# Patient Record
Sex: FEMALE | Race: WHITE | NOT HISPANIC OR LATINO | Employment: UNEMPLOYED | URBAN - METROPOLITAN AREA
[De-identification: names, ages, dates, MRNs, and addresses within clinical notes are randomized per-mention and may not be internally consistent; named-entity substitution may affect disease eponyms.]

---

## 2021-02-10 ENCOUNTER — IMMUNIZATION (OUTPATIENT)
Dept: VACCINE CLINIC | Facility: HOSPITAL | Age: 41
End: 2021-02-10

## 2021-02-10 PROCEDURE — 91300 HC SARSCOV02 VAC 30MCG/0.3ML IM: CPT | Performed by: INTERNAL MEDICINE

## 2021-02-10 PROCEDURE — 0001A: CPT | Performed by: INTERNAL MEDICINE

## 2021-03-03 ENCOUNTER — IMMUNIZATION (OUTPATIENT)
Dept: VACCINE CLINIC | Facility: HOSPITAL | Age: 41
End: 2021-03-03

## 2021-03-03 PROCEDURE — 0002A: CPT | Performed by: INTERNAL MEDICINE

## 2021-03-03 PROCEDURE — 91300 HC SARSCOV02 VAC 30MCG/0.3ML IM: CPT | Performed by: INTERNAL MEDICINE

## 2021-11-16 ENCOUNTER — IMMUNIZATION (OUTPATIENT)
Dept: VACCINE CLINIC | Facility: HOSPITAL | Age: 41
End: 2021-11-16

## 2021-11-16 PROCEDURE — 0004A ADM SARSCOV2 30MCG/0.3ML BOOSTER: CPT | Performed by: INTERNAL MEDICINE

## 2021-11-16 PROCEDURE — 91300 HC SARSCOV02 VAC 30MCG/0.3ML IM: CPT | Performed by: INTERNAL MEDICINE

## 2023-05-13 ENCOUNTER — HOSPITAL ENCOUNTER (EMERGENCY)
Facility: HOSPITAL | Age: 43
Discharge: HOME | End: 2023-05-13
Attending: EMERGENCY MEDICINE
Payer: COMMERCIAL

## 2023-05-13 ENCOUNTER — APPOINTMENT (OUTPATIENT)
Dept: CT IMAGING | Facility: HOSPITAL | Age: 43
End: 2023-05-13
Payer: COMMERCIAL

## 2023-05-13 VITALS
BODY MASS INDEX: 24.25 KG/M2 | HEIGHT: 68 IN | SYSTOLIC BLOOD PRESSURE: 114 MMHG | DIASTOLIC BLOOD PRESSURE: 72 MMHG | WEIGHT: 160 LBS | OXYGEN SATURATION: 99 % | TEMPERATURE: 100.1 F | RESPIRATION RATE: 14 BRPM | HEART RATE: 99 BPM

## 2023-05-13 DIAGNOSIS — R11.2 NAUSEA AND VOMITING, UNSPECIFIED VOMITING TYPE: ICD-10-CM

## 2023-05-13 DIAGNOSIS — R51.9 ACUTE NONINTRACTABLE HEADACHE, UNSPECIFIED HEADACHE TYPE: Primary | ICD-10-CM

## 2023-05-13 LAB
ALBUMIN SERPL-MCNC: 4.4 G/DL (ref 3.5–5.2)
ALBUMIN/GLOB SERPL: 1.8 G/DL
ALP SERPL-CCNC: 82 U/L (ref 39–117)
ALT SERPL W P-5'-P-CCNC: 22 U/L (ref 1–33)
ANION GAP SERPL CALCULATED.3IONS-SCNC: 14.5 MMOL/L (ref 5–15)
AST SERPL-CCNC: 21 U/L (ref 1–32)
BASOPHILS # BLD AUTO: 0.01 10*3/MM3 (ref 0–0.2)
BASOPHILS NFR BLD AUTO: 0.2 % (ref 0–1.5)
BILIRUB SERPL-MCNC: 0.4 MG/DL (ref 0–1.2)
BUN SERPL-MCNC: 17 MG/DL (ref 6–20)
BUN/CREAT SERPL: 19.8 (ref 7–25)
CALCIUM SPEC-SCNC: 9.1 MG/DL (ref 8.6–10.5)
CHLORIDE SERPL-SCNC: 103 MMOL/L (ref 98–107)
CO2 SERPL-SCNC: 23.5 MMOL/L (ref 22–29)
CREAT SERPL-MCNC: 0.86 MG/DL (ref 0.57–1)
DEPRECATED RDW RBC AUTO: 44.4 FL (ref 37–54)
EGFRCR SERPLBLD CKD-EPI 2021: 87.2 ML/MIN/1.73
EOSINOPHIL # BLD AUTO: 0.01 10*3/MM3 (ref 0–0.4)
EOSINOPHIL NFR BLD AUTO: 0.2 % (ref 0.3–6.2)
ERYTHROCYTE [DISTWIDTH] IN BLOOD BY AUTOMATED COUNT: 13.6 % (ref 12.3–15.4)
GLOBULIN UR ELPH-MCNC: 2.5 GM/DL
GLUCOSE SERPL-MCNC: 115 MG/DL (ref 65–99)
HCT VFR BLD AUTO: 41.5 % (ref 34–46.6)
HGB BLD-MCNC: 13.6 G/DL (ref 12–15.9)
IMM GRANULOCYTES # BLD AUTO: 0.02 10*3/MM3 (ref 0–0.05)
IMM GRANULOCYTES NFR BLD AUTO: 0.3 % (ref 0–0.5)
LYMPHOCYTES # BLD AUTO: 0.66 10*3/MM3 (ref 0.7–3.1)
LYMPHOCYTES NFR BLD AUTO: 9.9 % (ref 19.6–45.3)
MCH RBC QN AUTO: 28.6 PG (ref 26.6–33)
MCHC RBC AUTO-ENTMCNC: 32.8 G/DL (ref 31.5–35.7)
MCV RBC AUTO: 87.4 FL (ref 79–97)
MONOCYTES # BLD AUTO: 0.45 10*3/MM3 (ref 0.1–0.9)
MONOCYTES NFR BLD AUTO: 6.8 % (ref 5–12)
NEUTROPHILS NFR BLD AUTO: 5.51 10*3/MM3 (ref 1.7–7)
NEUTROPHILS NFR BLD AUTO: 82.6 % (ref 42.7–76)
NRBC BLD AUTO-RTO: 0 /100 WBC (ref 0–0.2)
PLATELET # BLD AUTO: 256 10*3/MM3 (ref 140–450)
PMV BLD AUTO: 10.1 FL (ref 6–12)
POTASSIUM SERPL-SCNC: 4.1 MMOL/L (ref 3.5–5.2)
PROT SERPL-MCNC: 6.9 G/DL (ref 6–8.5)
RBC # BLD AUTO: 4.75 10*6/MM3 (ref 3.77–5.28)
SODIUM SERPL-SCNC: 141 MMOL/L (ref 136–145)
WBC NRBC COR # BLD: 6.66 10*3/MM3 (ref 3.4–10.8)

## 2023-05-13 PROCEDURE — 36415 COLL VENOUS BLD VENIPUNCTURE: CPT

## 2023-05-13 PROCEDURE — 99282 EMERGENCY DEPT VISIT SF MDM: CPT

## 2023-05-13 PROCEDURE — 70450 CT HEAD/BRAIN W/O DYE: CPT

## 2023-05-13 PROCEDURE — 85025 COMPLETE CBC W/AUTO DIFF WBC: CPT | Performed by: NURSE PRACTITIONER

## 2023-05-13 PROCEDURE — 80053 COMPREHEN METABOLIC PANEL: CPT | Performed by: NURSE PRACTITIONER

## 2023-05-13 RX ORDER — ONDANSETRON 4 MG/1
4 TABLET, ORALLY DISINTEGRATING ORAL EVERY 6 HOURS PRN
Qty: 12 TABLET | Refills: 0 | Status: SHIPPED | OUTPATIENT
Start: 2023-05-13

## 2023-05-13 RX ADMIN — SODIUM CHLORIDE, POTASSIUM CHLORIDE, SODIUM LACTATE AND CALCIUM CHLORIDE 1000 ML: 600; 310; 30; 20 INJECTION, SOLUTION INTRAVENOUS at 12:35

## 2023-05-13 NOTE — ED PROVIDER NOTES
EMERGENCY DEPARTMENT ENCOUNTER    Room Number:  04/04  Date of encounter:  5/13/2023  PCP: Lulu Stoner APRN  Patient Care Team:  Lulu Stoner APRN as PCP - General (Family Medicine)   Independent Historians: Patient          HPI:  Chief Complaint: Headache  A complete HPI/ROS/PMH/PSH/SH/FH are unobtainable due to: Nothing    Chronic or social conditions impacting patient care (social determinants of health): None    Context: Heather Schofield is a 43 y.o. female with a history of anxiety who arrives to the ED via private vehicle.  Patient presents with c/o mild, constant, dull diffuse headache since early this morning.  Patient states that around 1230 this morning she had an episode of violent vomiting, she had what she calls a headache that felt like her head was going to explode.  She states that she laid down on the bathroom floor for about 10 minutes and the headache was starting to improve.  She did attempt to go to a ER last night, however due to the long wait she went home.  She states she was able to go to bed and rest last night.  She states that as the day has progressed her headache has improved.  She did speak with her primary care and was recommended that she come to the ER.  She states that she has not vomited since around 2 AM this morning and has had a couple episodes of diarrhea.    Patient denies fever, chills, visual changes, neck pain, neck stiffness, dizziness or weakness.  Patient states that nothing makes the symptoms better and nothing worsens symptoms.  LMP-8 days ago.  Patient is not a smoker, no illicit drug use and occasional alcohol use.    Review of prior external notes (non-ED): Medical records reviewed in [facility], patient was seen at the [facility] 1/24/2023 for suspected exposure to COVID-19.    Review of prior external test results outside of this encounter: COVID-19 swab 1/24/2023 was negative    PAST MEDICAL HISTORY  Active Ambulatory Problems     Diagnosis Date Noted   • No  Active Ambulatory Problems     Resolved Ambulatory Problems     Diagnosis Date Noted   • No Resolved Ambulatory Problems     Past Medical History:   Diagnosis Date   • Anxiety        The patient has started, but not completed, their COVID-19 vaccination series.    PAST SURGICAL HISTORY  History reviewed. No pertinent surgical history.      FAMILY HISTORY  History reviewed. No pertinent family history.      SOCIAL HISTORY  Social History     Socioeconomic History   • Marital status:    Tobacco Use   • Smoking status: Never   Vaping Use   • Vaping Use: Never used   Substance and Sexual Activity   • Alcohol use: Yes     Comment: socially   • Drug use: Never   • Sexual activity: Defer         ALLERGIES  Cephalosporins and Penicillins        REVIEW OF SYSTEMS  Review of Systems     All systems reviewed and negative except for those discussed in HPI.       PHYSICAL EXAM    I have reviewed the triage vital signs and nursing notes.    ED Triage Vitals   Temp Heart Rate Resp BP SpO2   05/13/23 1209 05/13/23 1209 05/13/23 1209 05/13/23 1219 05/13/23 1209   100.1 °F (37.8 °C) (!) 140 20 124/83 97 %       Physical Exam  GENERAL: Well appearing, nontoxic appearing, not distressed  HENT: normocephalic, atraumatic  EYES: no scleral icterus, PERRL  CV: regular rhythm, regular rate, no murmur  RESPIRATORY: normal effort, CTAB  ABDOMEN: soft   MUSCULOSKELETAL: no deformity  No cervical, thoracic or lumbar vertebral tenderness to palpation  No step off or crepitus noted  NEURO: alert, moves all extremities, follows commands, mental status normal/baseline  Recent and remote memory functions are normal  Patient is attentive with normal concentration  Language is fluent  Speech is clear  Speech is nondysarthric  Symmetric smile with no facial droop  Eyes close shut strongly bilaterally  Symmetric eyebrow raise bilaterally  EOMI, PERRL  CN II-XII grossly normal otherwise  5/5 strength to bilateral upper and lower extremities  No  pronator drift  Intact FNF   SKIN: warm, dry, no rash   Psych: Appropriate mood and affect  Nursing notes and vital signs reviewed          LAB RESULTS  Recent Results (from the past 24 hour(s))   Comprehensive Metabolic Panel    Collection Time: 05/13/23  1:06 PM    Specimen: Blood   Result Value Ref Range    Glucose 115 (H) 65 - 99 mg/dL    BUN 17 6 - 20 mg/dL    Creatinine 0.86 0.57 - 1.00 mg/dL    Sodium 141 136 - 145 mmol/L    Potassium 4.1 3.5 - 5.2 mmol/L    Chloride 103 98 - 107 mmol/L    CO2 23.5 22.0 - 29.0 mmol/L    Calcium 9.1 8.6 - 10.5 mg/dL    Total Protein 6.9 6.0 - 8.5 g/dL    Albumin 4.4 3.5 - 5.2 g/dL    ALT (SGPT) 22 1 - 33 U/L    AST (SGOT) 21 1 - 32 U/L    Alkaline Phosphatase 82 39 - 117 U/L    Total Bilirubin 0.4 0.0 - 1.2 mg/dL    Globulin 2.5 gm/dL    A/G Ratio 1.8 g/dL    BUN/Creatinine Ratio 19.8 7.0 - 25.0    Anion Gap 14.5 5.0 - 15.0 mmol/L    eGFR 87.2 >60.0 mL/min/1.73   CBC Auto Differential    Collection Time: 05/13/23  1:06 PM    Specimen: Blood   Result Value Ref Range    WBC 6.66 3.40 - 10.80 10*3/mm3    RBC 4.75 3.77 - 5.28 10*6/mm3    Hemoglobin 13.6 12.0 - 15.9 g/dL    Hematocrit 41.5 34.0 - 46.6 %    MCV 87.4 79.0 - 97.0 fL    MCH 28.6 26.6 - 33.0 pg    MCHC 32.8 31.5 - 35.7 g/dL    RDW 13.6 12.3 - 15.4 %    RDW-SD 44.4 37.0 - 54.0 fl    MPV 10.1 6.0 - 12.0 fL    Platelets 256 140 - 450 10*3/mm3    Neutrophil % 82.6 (H) 42.7 - 76.0 %    Lymphocyte % 9.9 (L) 19.6 - 45.3 %    Monocyte % 6.8 5.0 - 12.0 %    Eosinophil % 0.2 (L) 0.3 - 6.2 %    Basophil % 0.2 0.0 - 1.5 %    Immature Grans % 0.3 0.0 - 0.5 %    Neutrophils, Absolute 5.51 1.70 - 7.00 10*3/mm3    Lymphocytes, Absolute 0.66 (L) 0.70 - 3.10 10*3/mm3    Monocytes, Absolute 0.45 0.10 - 0.90 10*3/mm3    Eosinophils, Absolute 0.01 0.00 - 0.40 10*3/mm3    Basophils, Absolute 0.01 0.00 - 0.20 10*3/mm3    Immature Grans, Absolute 0.02 0.00 - 0.05 10*3/mm3    nRBC 0.0 0.0 - 0.2 /100 WBC       Ordered the above labs and  independently reviewed the results.        RADIOLOGY  CT Head Without Contrast    Result Date: 5/13/2023  CT SCAN OF THE BRAIN WITHOUT CONTRAST  HISTORY: Headache and vomiting.  The CT scan was performed as an emergency procedure through the brain without contrast. The ventricles are normal in size and midline. There is no evidence of intracranial hemorrhage or focal lesion or mass effect. The visualized sinuses and mastoid air cells are clear.      Radiation dose reduction techniques were utilized, including automated exposure control and exposure modulation based on body size.  This report was finalized on 5/13/2023 1:53 PM by Dr. Derrick Wade M.D.        I ordered the above noted radiological studies. Reviewed by me and discussed with radiologist.  See dictation for official radiology interpretation.      PROCEDURES    Procedures    DIFFERENTIAL DIAGNOSIS:  Differential Diagnosis for Headache include but are not limited to the following:  -Migraine  - Cluster Headache  - Ischemic Stroke  - Intracranial Hemorrhage  - Cerebral Aneurysm  - Temporal Arteritis  - Meningitis(Bacterial or Viral)  - Sinusitis, Dental Infection  -Viral Syndrome,  -Hypertension  -Concussion      PROGRESS, DATA ANALYSIS, CONSULTS, AND MEDICAL DECISION MAKING    All labs have been independently reviewed by me.  All radiology studies have been reviewed by me and discussed with radiologist dictating the report.   EKG's independently viewed and interpreted by me.  Discussion below represents my analysis of pertinent findings related to patient's condition, differential diagnosis, treatment plan and final disposition.        ED Course as of 05/13/23 1516   Sat May 13, 2023   1250 Patient is a well-appearing 43-year-old who presents today after having a severe headache around 1230 this morning after violently vomiting.  Headache is improving and is now down to just a dull constant ache, however she did speak with on-call primary care and was  recommended that she come to the ER.  Plan for basic labs, IV fluids and CT of the head. [MS]   1308  Discussed with Dr. Wade regarding the CT head results which revealed no acute abnormalities  See dictation for official radiology interpretation.     [MS]   1338 WBC: 6.66 [MS]   1338 Hemoglobin: 13.6 [MS]   1338 Hematocrit: 41.5 [MS]   1338 BUN: 17 [MS]   1338 Creatinine: 0.86 [MS]   1400 Reviewed pt's history and workup with Dr. Nava.  After a bedside evaluation, he agrees with the plan of care.     [MS]   1410 Patient updated on unremarkable work-up done here today including labs and CT of the head.  Patient is feeling better after receiving a liter of IV fluids.  She will be discharged home with a prescription for Zofran and strict return to ER precautions. [MS]      ED Course User Index  [MS] Nadia Smith, APRN         DISPOSITION  ED Disposition     ED Disposition   Discharge    Condition   Stable    Comment   --           Discussed plan for discharge, as there is no emergent indication for admission. Pt/family is agreeable and understands need for follow up and repeat testing.  Pt is aware that discharge does not mean that nothing is wrong but it indicates no emergency is present that requires admission and they must continue care with follow-up as given below or physician of their choice.   Patient/Family voiced understanding of above instructions.  Patient discharged in stable condition.    DIAGNOSIS  Final diagnoses:   Acute nonintractable headache, unspecified headache type   Nausea and vomiting, unspecified vomiting type       FOLLOW UP   PATIENT CONNECTION - [facility] [address]  [phone]  Call in 2 days        RX     Medication List      New Prescriptions    ondansetron ODT 4 MG disintegrating tablet  Commonly known as: ZOFRAN-ODT  Place 1 tablet on the tongue Every 6 (Six) Hours As Needed for Nausea or Vomiting.           Where to Get Your Medications      These  medications were sent to [facility] [place] [address] [facility] [address]    Hours: 7:00 AM-6:00 PM Mon-Fri, 8:00 AM-4:30 PM Sat-Sun (Closed 12-12:30PM) Phone: [phone]   · ondansetron ODT 4 MG disintegrating tablet           AS OF 15:16 EDT VITALS:    BP - 114/72  HR - 99  TEMP - 100.1 °F (37.8 °C) (Tympanic)  O2 SATS - 99%      MEDICATIONS GIVEN IN ER    Medications   lactated ringers bolus 1,000 mL (1,000 mL Intravenous Not Given 5/13/23 1311)   lactated ringers bolus 1,000 mL (0 mL Intravenous Stopped 5/13/23 1409)                Note Disclaimer: At [facility], we believe that sharing information builds trust and better relationships. You are receiving this note because you recently visited [facility]. It is possible you will see health information before a provider has talked with you about it. This kind of information can be easy to misunderstand. To help you fully understand what it means for your health, we urge you to discuss this note with your provider.       Nadia Smith, APRN  05/13/23 1516

## 2023-05-13 NOTE — ED PROVIDER NOTES
Pt presents to the ED c/o  cute onset of nausea with vomiting, diarrhea overnight last night with a significant headache.  Headache lasted about 10 minutes before started to improve.  Was advised to come in for the ER for evaluation today.  Diarrhea has slowed down, denies any fever, neck stiffness.  Maintains a mild dull headache without significant visual changes no numbness or weakness of the arms or legs.     On exam,   General: No acute distress, nontoxic  HEENT: Mucous membranes moist, atraumatic, EOMI  Neck: Full ROM  Pulm: Symmetric chest rise, nonlabored, lungs CTAB  Cardiovascular: Regular rate and rhythm, intact distal pulses  GI: Soft, nontender, nondistended, no rebound, no guarding, bowel sounds present  MSK: Full ROM, no deformity  Skin: Warm, dry  Neuro: Awake, alert, oriented x 4, GCS 15, no facial droop, no dysarthria aphasia, moving all extremities, no focal deficits  Psych: Calm, cooperative          Plan:   ED Course as of 05/13/23 1825   Sat May 13, 2023   1250 Patient is a well-appearing 43-year-old who presents today after having a severe headache around 1230 this morning after violently vomiting.  Headache is improving and is now down to just a dull constant ache, however she did speak with on-call primary care and was recommended that she come to the ER.  Plan for basic labs, IV fluids and CT of the head. [MS]   1308  Discussed with Dr. Wade regarding the CT head results which revealed no acute abnormalities  See dictation for official radiology interpretation.     [MS]   1338 WBC: 6.66 [MS]   1338 Hemoglobin: 13.6 [MS]   1338 Hematocrit: 41.5 [MS]   1338 BUN: 17 [MS]   1338 Creatinine: 0.86 [MS]   1400 Reviewed pt's history and workup with Dr. Nava.  After a bedside evaluation, he agrees with the plan of care.     [MS]   1410 Patient updated on unremarkable work-up done here today including labs and CT of the head.  Patient is feeling better after receiving a liter of IV fluids.  She  will be discharged home with a prescription for Zofran and strict return to ER precautions. [MS]      ED Course User Index  [MS] Sarah Nadia R, APRN     Reassuring work-up, no intracranial hemorrhage, suspect probably a viral syndrome, I do not have any acute concerns for ruptured aneurysm, no emergent need at this point for CTA of the head or neck she is very well-appearing and in no acute distress.  Symptoms are improving.  Recommend continued outpatient supportive care with outpatient follow-up.  ED return for worsening symptoms as needed.     Attestation:  The SRINIVAS and I have discussed this patient's history, physical exam, and treatment plan.  I have reviewed the documentation and personally had a face to face interaction with the patient. I affirm the documentation and agree with the treatment and plan.  The attached note describes my personal findings.          Christos Nava MD  05/13/23 2433

## 2023-05-13 NOTE — DISCHARGE INSTRUCTIONS
Medications as ordered  Clear liquid diet for 24 hours then slowly advance as tolerated  Follow-up with your PMD in 3 to 5 days if symptoms not improving  Return to the ER for fever, chills, chest pain, shortness of breath, dizziness, weakness, worsening headache, neck pain or any new or worsening symptoms